# Patient Record
Sex: FEMALE | Race: WHITE | URBAN - METROPOLITAN AREA
[De-identification: names, ages, dates, MRNs, and addresses within clinical notes are randomized per-mention and may not be internally consistent; named-entity substitution may affect disease eponyms.]

---

## 2021-04-21 ENCOUNTER — OFFICE VISIT (OUTPATIENT)
Dept: URBAN - METROPOLITAN AREA CLINIC 56 | Facility: CLINIC | Age: 31
End: 2021-04-21
Payer: COMMERCIAL

## 2021-04-21 DIAGNOSIS — E23.6 OTHER DISORDERS OF PITUITARY GLAND: Primary | ICD-10-CM

## 2021-04-21 DIAGNOSIS — E06.3 HASHIMOTO'S THYROIDITIS: ICD-10-CM

## 2021-04-21 DIAGNOSIS — G44.89 OTHER HEADACHE SYNDROME: ICD-10-CM

## 2021-04-21 PROCEDURE — 92083 EXTENDED VISUAL FIELD XM: CPT | Performed by: OPTOMETRIST

## 2021-04-21 PROCEDURE — 92004 COMPRE OPH EXAM NEW PT 1/>: CPT | Performed by: OPTOMETRIST

## 2021-04-21 PROCEDURE — 92133 CPTRZD OPH DX IMG PST SGM ON: CPT | Performed by: OPTOMETRIST

## 2021-04-21 ASSESSMENT — KERATOMETRY
OD: 44.70
OD: 45.01

## 2021-04-21 ASSESSMENT — VISUAL ACUITY
OD: 20/20
OS: 20/20

## 2021-04-21 ASSESSMENT — INTRAOCULAR PRESSURE
OS: 23
OD: 22

## 2021-04-21 NOTE — IMPRESSION/PLAN
Impression: Hashimoto's thyroiditis: E06.3.
-patient reports diagnosis Plan: Discussed possible eye symptoms of iritis with patient. 
Continue with endocrinologist.

## 2021-04-21 NOTE — IMPRESSION/PLAN
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 Episodes of tension like headache with accompanying dizziness/fainting and confusion. Patient reports MRI was done. Plan: Patient education there is no obvious ocular cause of these episodes. Recommend that patient call 911 immediately should this happen again. 
Continue care with neurologist.

## 2021-04-21 NOTE — IMPRESSION/PLAN
Impression: Other disorders of pituitary gland: E23.6.
-patient referred by neurologist for eye exam.  Patient notes being told that the pituitary gland was swollen and she may need a LP. MRI was done and fine. There is no visual field defect suggestive of vision loss from pituitary adenoma. There is no evidence of optic nerve swelling. Plan: Discussed in depth with patient. Recommend patient continue with neurologist as directed. 
RTC 6 months VF 30-2, OCT RNFL/GCA or sooner if any vision changes